# Patient Record
Sex: FEMALE | HISPANIC OR LATINO | URBAN - METROPOLITAN AREA
[De-identification: names, ages, dates, MRNs, and addresses within clinical notes are randomized per-mention and may not be internally consistent; named-entity substitution may affect disease eponyms.]

---

## 2018-05-22 ENCOUNTER — FOLLOW UP ESTABLISHED (OUTPATIENT)
Dept: URBAN - METROPOLITAN AREA CLINIC 24 | Facility: CLINIC | Age: 38
End: 2018-05-22
Payer: COMMERCIAL

## 2018-05-22 DIAGNOSIS — Z79.4 LONG TERM (CURRENT) USE OF INSULIN: ICD-10-CM

## 2018-05-22 DIAGNOSIS — E10.3293 DIABETES MELLITUS TYPE 1 WITH MILD NON-PROLIFERATI: ICD-10-CM

## 2018-05-22 DIAGNOSIS — H34.8112 CENTRAL RETINAL VEIN OCCLUSION, STABLE, RIGHT EYE: Primary | ICD-10-CM

## 2018-05-22 PROCEDURE — 92134 CPTRZ OPH DX IMG PST SGM RTA: CPT | Performed by: OPHTHALMOLOGY

## 2018-05-22 PROCEDURE — 92014 COMPRE OPH EXAM EST PT 1/>: CPT | Performed by: OPHTHALMOLOGY

## 2018-05-22 ASSESSMENT — INTRAOCULAR PRESSURE
OD: 14
OS: 15

## 2021-03-31 ENCOUNTER — OFFICE VISIT (OUTPATIENT)
Dept: URBAN - METROPOLITAN AREA CLINIC 25 | Facility: CLINIC | Age: 41
End: 2021-03-31
Payer: COMMERCIAL

## 2021-03-31 DIAGNOSIS — E11.3392 TYPE 2 DIAB W MODERATE NONPRLF DIAB RTNOP W/O MACULAR EDEMA, LEFT EYE: Primary | ICD-10-CM

## 2021-03-31 PROCEDURE — 99204 OFFICE O/P NEW MOD 45 MIN: CPT | Performed by: OPTOMETRIST

## 2021-03-31 ASSESSMENT — INTRAOCULAR PRESSURE
OS: 18
OD: 19

## 2021-03-31 NOTE — IMPRESSION/PLAN
Impression: Type 2 diab w moderate nonprlf diab rtnop w/o macular edema, left eye: A15.3348. Plan: Refer to retina for eval and treatment as needed.

## 2021-04-07 ENCOUNTER — OFFICE VISIT (OUTPATIENT)
Dept: URBAN - METROPOLITAN AREA CLINIC 24 | Facility: CLINIC | Age: 41
End: 2021-04-07
Payer: COMMERCIAL

## 2021-04-07 DIAGNOSIS — E11.3313 TYPE 2 DIAB W MODERATE NONPRLF DIAB RTNOP W MACULAR EDEMA, BILATERAL: Primary | ICD-10-CM

## 2021-04-07 DIAGNOSIS — H34.8111 CENTRAL RETINAL VEIN OCCLUSION, RIGHT EYE, WITH RETINAL NEOVASCULARIZATION: ICD-10-CM

## 2021-04-07 PROCEDURE — 92235 FLUORESCEIN ANGRPH MLTIFRAME: CPT | Performed by: OPHTHALMOLOGY

## 2021-04-07 PROCEDURE — 99214 OFFICE O/P EST MOD 30 MIN: CPT | Performed by: OPHTHALMOLOGY

## 2021-04-07 PROCEDURE — 92134 CPTRZ OPH DX IMG PST SGM RTA: CPT | Performed by: OPHTHALMOLOGY

## 2021-04-07 ASSESSMENT — INTRAOCULAR PRESSURE
OD: 15
OS: 18

## 2021-04-07 NOTE — IMPRESSION/PLAN
Impression: Central retinal vein occlusion RIGHT - risk NV Plan: DM / CRVO OD -- Old '16. YOUNG pt Type 1 DM. BP mod. Was on Combigan No injx / Atrophy was limiting OD '16 / '18. No CME. Photopsias of Hypoglycemia. Risk NVG / NVA but iris NML today.

## 2021-04-07 NOTE — IMPRESSION/PLAN
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 Lost to f/u '16. Saad '18. Lost F/u 
   NOW back to care '21 w worsening DM --  TODAY confirm old RVO dmg OD Few cysts only DME. No Rubeosis (careful exam given h/o RVO and DM) ENCOURAGED BG control and HYDRATION (prevent thickening blood) F/u DR. Ethan alonso eye 3mo.   RETINA PRN w FA